# Patient Record
Sex: FEMALE | Race: WHITE
[De-identification: names, ages, dates, MRNs, and addresses within clinical notes are randomized per-mention and may not be internally consistent; named-entity substitution may affect disease eponyms.]

---

## 2019-07-22 ENCOUNTER — HOSPITAL ENCOUNTER (EMERGENCY)
Dept: HOSPITAL 74 - JER | Age: 44
LOS: 1 days | Discharge: HOME | End: 2019-07-23
Payer: COMMERCIAL

## 2019-07-22 VITALS — BODY MASS INDEX: 34 KG/M2

## 2019-07-22 DIAGNOSIS — G44.209: Primary | ICD-10-CM

## 2019-07-22 LAB
ALBUMIN SERPL-MCNC: 4.1 G/DL (ref 3.4–5)
ALP SERPL-CCNC: 59 U/L (ref 45–117)
ALT SERPL-CCNC: 21 U/L (ref 13–61)
ANION GAP SERPL CALC-SCNC: 6 MMOL/L (ref 8–16)
APPEARANCE UR: (no result)
AST SERPL-CCNC: 13 U/L (ref 15–37)
BACTERIA # UR AUTO: 167.2 /HPF
BASOPHILS # BLD: 0.4 % (ref 0–2)
BILIRUB SERPL-MCNC: 0.2 MG/DL (ref 0.2–1)
BILIRUB UR STRIP.AUTO-MCNC: NEGATIVE MG/DL
BUN SERPL-MCNC: 22.6 MG/DL (ref 7–18)
CALCIUM SERPL-MCNC: 8.7 MG/DL (ref 8.5–10.1)
CASTS URNS QL MICRO: 3 /LPF (ref 0–8)
CHLORIDE SERPL-SCNC: 105 MMOL/L (ref 98–107)
CO2 SERPL-SCNC: 28 MMOL/L (ref 21–32)
COLOR UR: YELLOW
CREAT SERPL-MCNC: 0.9 MG/DL (ref 0.55–1.3)
DEPRECATED RDW RBC AUTO: 19.1 % (ref 11.6–15.6)
EOSINOPHIL # BLD: 0.3 % (ref 0–4.5)
EPITH CASTS URNS QL MICRO: 9.4 /HPF
GLUCOSE SERPL-MCNC: 125 MG/DL (ref 74–106)
HCT VFR BLD CALC: 36.6 % (ref 32.4–45.2)
HGB BLD-MCNC: 11.9 GM/DL (ref 10.7–15.3)
KETONES UR QL STRIP: (no result)
LEUKOCYTE ESTERASE UR QL STRIP.AUTO: NEGATIVE
LYMPHOCYTES # BLD: 11.9 % (ref 8–40)
MCH RBC QN AUTO: 25.9 PG (ref 25.7–33.7)
MCHC RBC AUTO-ENTMCNC: 32.6 G/DL (ref 32–36)
MCV RBC: 79.5 FL (ref 80–96)
MONOCYTES # BLD AUTO: 3 % (ref 3.8–10.2)
NEUTROPHILS # BLD: 84.4 % (ref 42.8–82.8)
NITRITE UR QL STRIP: NEGATIVE
PH UR: 7 [PH] (ref 5–8)
PLATELET # BLD AUTO: 292 K/MM3 (ref 134–434)
PMV BLD: 10.3 FL (ref 7.5–11.1)
POTASSIUM SERPLBLD-SCNC: 4.8 MMOL/L (ref 3.5–5.1)
PROT SERPL-MCNC: 8.2 G/DL (ref 6.4–8.2)
PROT UR QL STRIP: (no result)
PROT UR QL STRIP: NEGATIVE
RBC # BLD AUTO: 4.6 M/MM3 (ref 3.6–5.2)
RBC # BLD AUTO: 665 /HPF (ref 0–4)
SODIUM SERPL-SCNC: 140 MMOL/L (ref 136–145)
SP GR UR: 1.03 (ref 1.01–1.03)
UROBILINOGEN UR STRIP-MCNC: 0.2 MG/DL (ref 0.2–1)
WBC # BLD AUTO: 10.4 K/MM3 (ref 4–10)
WBC # UR AUTO: 8 /HPF (ref 0–5)

## 2019-07-22 PROCEDURE — 3E0337Z INTRODUCTION OF ELECTROLYTIC AND WATER BALANCE SUBSTANCE INTO PERIPHERAL VEIN, PERCUTANEOUS APPROACH: ICD-10-PCS

## 2019-07-22 PROCEDURE — 3E033GC INTRODUCTION OF OTHER THERAPEUTIC SUBSTANCE INTO PERIPHERAL VEIN, PERCUTANEOUS APPROACH: ICD-10-PCS

## 2019-07-22 NOTE — PDOC
*Physical Exam





- Vital Signs


 Last Vital Signs











Temp Pulse Resp BP Pulse Ox


 


 97.9 F   66   18   126/68   99 


 


 07/22/19 19:11  07/22/19 19:11  07/22/19 19:11  07/22/19 19:11  07/22/19 19:11














Medical Decision Making





- Medical Decision Making





07/22/19 20:57


Patient seen by the advanced practice provider under my direct supervision. 

Ancillary testing reviewed as necessary.


I agree with plan as outlined by the advanced practice provider.





*DC/Admit/Observation/Transfer


Diagnosis at time of Disposition: 


 Headache








- Referrals


Referrals: 


Salud Ledezma MD [Primary Care Provider] - 





- Patient Instructions





- Post Discharge Activity

## 2019-07-22 NOTE — PDOC
Rapid Medical Evaluation


Time Seen by Provider: 07/22/19 19:11


Medical Evaluation: 





07/22/19 19:11


I have performed a brief in-person evaluation of this patient.





The patient presents with a chief complaint of: frontal headache x2 days





Pertinent physical exam findings: no focal deficits





I have ordered the following: urine, labs, NS, reglan, benadryl





The patient will proceed to the ED for further evaluation.





**Discharge Disposition





- Diagnosis


 Headache








- Referrals





- Patient Instructions





- Post Discharge Activity

## 2019-07-22 NOTE — PDOC
History of Present Illness





- General


Chief Complaint: Headache


Stated Complaint: VOMITING NAUSEA


Time Seen by Provider: 07/22/19 19:11


History Source: Patient





- History of Present Illness


Initial Comments: 





07/22/19 23:01


44-year-old female with headache, photosensitivity, nausea and vomiting for the 

last 3 days. Reports that the headache worsened over the last 3 days with 

increasing photosensitivity. Patient reports that she has a history of migraine 

and he is currently not on medication. Last migraine was several years ago.





Patient status post weight loss or seizure on June 17. Patient reports that she 

has been tolerating food and and water with no complications since surgery.











Past History





- Past Medical History


Allergies/Adverse Reactions: 


 Allergies











Allergy/AdvReac Type Severity Reaction Status Date / Time


 


No Known Allergies Allergy   Verified 07/22/19 19:13











Home Medications: 


Ambulatory Orders





NK [No Known Home Medication]  07/22/19 








COPD: No


Other medical history: migraines





- Surgical History


Gastric Stapling: Yes (gastric balloon)





- Suicide/Smoking/Psychosocial Hx


Smoking History: Never smoked





**Review of Systems





- Review of Systems


Able to Perform ROS?: Yes


Is the patient limited English proficient: No


Constitutional: No: Symptoms Reported, See HPI, Chills, Diaphoresis, Fever, 

Loss of Appetite, Malaise, Night Sweats, Weakness, Weight Stable, Unintentional 

Wgt. Loss, Unexplained wgt Loss, Other


HEENTM: Yes: Other (photosensitivity.).  No: Symptoms Reported, See HPI, Eye 

Pain, Blurred Vision, Tearing, Recent change in vision, Double Vision, Cataracts

, Ear Pain, Ocular Prothesis, Ear Discharge, Nose Pain, Nose Congestion, 

Tinnitus, Nose Bleeding, Hearing Loss, Throat Pain, Throat Swelling, Mouth Pain

, Dental Problems, Difficulty Swallowing, Mouth Swelling


Respiratory: No: Symptoms reported, See HPI, Cough, Orthopnea, Shortness of 

Breath, SOB with Exertion, SOB at Rest, Stridor, Wheezing, Productive cough, 

Hemoptysis, Other


Cardiac (ROS): No: Symptoms Reported, See HPI, Chest Pain, Edema, Irregular 

Heart Rate, Lightheadedness, Palpitations, Syncope, Chest Tightness, Other


Neurological: Yes: Headache





*Physical Exam





- Vital Signs


 Last Vital Signs











Temp Pulse Resp BP Pulse Ox


 


 97.9 F   66   18   126/68   99 


 


 07/22/19 19:11  07/22/19 19:11  07/22/19 19:11  07/22/19 19:11  07/22/19 19:11














- Physical Exam


General Appearance: Yes: Appropriately Dressed


HEENT: positive: Normal ENT Inspection, Other (PERRLA)


Respiratory/Chest: positive: Lungs Clear, Normal Breath Sounds


Cardiovascular: positive: Regular Rhythm, Regular Rate


Gastrointestinal/Abdominal: positive: Normal Bowel Sounds, Soft.  negative: 

Tender


Extremity: positive: Normal Capillary Refill, Normal Inspection, Normal Range 

of Motion


Integumentary: positive: Normal Color, Dry, Warm


Neurologic: positive: CNs II-XII NML intact, Fully Oriented, Alert, Normal Mood/

Affect, Normal Response, Motor Strength 5/5





ED Treatment Course





- LABORATORY


CBC & Chemistry Diagram: 


 07/22/19 20:37





 07/22/19 20:37





- RADIOLOGY


Radiology Studies Ordered: 














 Category Date Time Status


 


 HEAD CT WITHOUT CONTRAST [CT] Stat CT Scan  07/22/19 20:23 Ordered














- Medications


Given in the ED: 


ED Medications














Discontinued Medications














Generic Name Dose Route Start Last Admin





  Trade Name Berry  PRN Reason Stop Dose Admin


 


Diphenhydramine HCl  25 mg  07/22/19 19:12  07/22/19 20:56





  Benadryl Injection -  IVPUSH  07/22/19 19:13  25 mg





  ONCE ONE   Administration





     





     





     





     


 


Sodium Chloride  1,000 mls @ 1,000 mls/hr  07/22/19 19:12  07/22/19 20:56





  Normal Saline -  IV  07/22/19 20:11  1,000 mls/hr





  ASDIR STA   Administration





     





     





     





     


 


Metoclopramide HCl  10 mg  07/22/19 19:12  07/22/19 20:56





  Reglan Injection -  IVPUSH  07/22/19 19:13  10 mg





  ONCE ONE   Administration





     





     





     





     














Progress Note





- Progress Note


Progress Note: 





A: Migraine








P: ivf





cbc





cmp








ua





ucg














*DC/Admit/Observation/Transfer


Diagnosis at time of Disposition: 


Headache


Qualifiers:


 Headache type: tension-type Headache chronicity pattern: acute headache 

Intractability: not intractable Qualified Code(s): G44.209 - Tension-type 

headache, unspecified, not intractable








- Discharge Dispostion


Disposition: HOME





- Referrals


Referrals: 


Salud Ledezma MD [Primary Care Provider] - Call tomorrow


Bartolo Segundo MD [Staff Physician] - Call tomorrow





- Patient Instructions


Printed Discharge Instructions:  Migraine -- Adult


Additional Instructions: 








Drink plenty of fluids





you were given a referral to see a neurologist please give them a call tomorrow.





Also follow-up with your primary care physician.





Return to the emergency room for any worsening symptoms.





- Post Discharge Activity


Forms/Work/School Notes:  Back to Work

## 2019-07-23 VITALS — HEART RATE: 68 BPM | TEMPERATURE: 98.2 F | SYSTOLIC BLOOD PRESSURE: 120 MMHG | DIASTOLIC BLOOD PRESSURE: 70 MMHG
